# Patient Record
Sex: MALE | Race: WHITE
[De-identification: names, ages, dates, MRNs, and addresses within clinical notes are randomized per-mention and may not be internally consistent; named-entity substitution may affect disease eponyms.]

---

## 2019-10-22 ENCOUNTER — HOSPITAL ENCOUNTER (OUTPATIENT)
Dept: HOSPITAL 92 - BICRAD | Age: 64
Discharge: HOME | End: 2019-10-22
Attending: FAMILY MEDICINE
Payer: MEDICARE

## 2019-10-22 DIAGNOSIS — M79.641: Primary | ICD-10-CM

## 2019-10-22 DIAGNOSIS — M79.89: ICD-10-CM

## 2019-10-22 NOTE — RAD
Exam:3 views right hand



HISTORY: Pain. Swelling x5 days. History of gout



COMPARISON: None



FINDINGS: No fracture. No cortical irregularity or periosteal reaction. Joint spaces are preserved.

There is soft tissue swelling. No evidence of radiopaque foreign body.



IMPRESSION: Soft tissue swelling without evidence of fracture or radiopaque foreign body.



Reported By: Zeny Mclean 

Electronically Signed:  10/22/2019 9:45 AM